# Patient Record
Sex: FEMALE | Race: BLACK OR AFRICAN AMERICAN | NOT HISPANIC OR LATINO | URBAN - METROPOLITAN AREA
[De-identification: names, ages, dates, MRNs, and addresses within clinical notes are randomized per-mention and may not be internally consistent; named-entity substitution may affect disease eponyms.]

---

## 2024-09-27 ENCOUNTER — EMERGENCY (EMERGENCY)
Facility: HOSPITAL | Age: 24
LOS: 1 days | Discharge: HOME | End: 2024-09-27
Attending: STUDENT IN AN ORGANIZED HEALTH CARE EDUCATION/TRAINING PROGRAM
Payer: MEDICAID

## 2024-09-27 VITALS
SYSTOLIC BLOOD PRESSURE: 148 MMHG | RESPIRATION RATE: 19 BRPM | TEMPERATURE: 98 F | OXYGEN SATURATION: 99 % | WEIGHT: 125 LBS | HEART RATE: 114 BPM | DIASTOLIC BLOOD PRESSURE: 72 MMHG | HEIGHT: 65 IN

## 2024-09-27 LAB
ALBUMIN SERPL ELPH-MCNC: 4.3 G/DL — SIGNIFICANT CHANGE UP (ref 3.5–5)
ANION GAP SERPL CALC-SCNC: 10 MMOL/L — SIGNIFICANT CHANGE UP (ref 5–17)
BASOPHILS # BLD AUTO: 0.02 K/UL — SIGNIFICANT CHANGE UP (ref 0–0.2)
BASOPHILS NFR BLD AUTO: 0.1 % — SIGNIFICANT CHANGE UP (ref 0–2)
BUN SERPL-MCNC: 10 MG/DL — SIGNIFICANT CHANGE UP (ref 7–18)
CALCIUM SERPL-MCNC: 9.5 MG/DL — SIGNIFICANT CHANGE UP (ref 8.4–10.5)
CHLORIDE SERPL-SCNC: 107 MMOL/L — SIGNIFICANT CHANGE UP (ref 96–108)
CO2 SERPL-SCNC: 20 MMOL/L — LOW (ref 22–31)
EOSINOPHIL # BLD AUTO: 0.01 K/UL — SIGNIFICANT CHANGE UP (ref 0–0.5)
EOSINOPHIL NFR BLD AUTO: 0.1 % — SIGNIFICANT CHANGE UP (ref 0–6)
FLUAV AG NPH QL: SIGNIFICANT CHANGE UP
FLUBV AG NPH QL: SIGNIFICANT CHANGE UP
GLUCOSE SERPL-MCNC: 111 MG/DL — HIGH (ref 70–99)
HCT VFR BLD CALC: 38.2 % — SIGNIFICANT CHANGE UP (ref 34.5–45)
HGB BLD-MCNC: 13.2 G/DL — SIGNIFICANT CHANGE UP (ref 11.5–15.5)
IMM GRANULOCYTES NFR BLD AUTO: 0.5 % — SIGNIFICANT CHANGE UP (ref 0–0.9)
LACTATE SERPL-SCNC: 1.5 MMOL/L — SIGNIFICANT CHANGE UP (ref 0.7–2)
LIDOCAIN IGE QN: 18 U/L — SIGNIFICANT CHANGE UP (ref 13–75)
LYMPHOCYTES # BLD AUTO: 0.85 K/UL — LOW (ref 1–3.3)
LYMPHOCYTES # BLD AUTO: 6 % — LOW (ref 13–44)
MCHC RBC-ENTMCNC: 30.6 PG — SIGNIFICANT CHANGE UP (ref 27–34)
MCHC RBC-ENTMCNC: 34.6 GM/DL — SIGNIFICANT CHANGE UP (ref 32–36)
MCV RBC AUTO: 88.6 FL — SIGNIFICANT CHANGE UP (ref 80–100)
MONOCYTES # BLD AUTO: 0.7 K/UL — SIGNIFICANT CHANGE UP (ref 0–0.9)
MONOCYTES NFR BLD AUTO: 4.9 % — SIGNIFICANT CHANGE UP (ref 2–14)
NEUTROPHILS # BLD AUTO: 12.51 K/UL — HIGH (ref 1.8–7.4)
NEUTROPHILS NFR BLD AUTO: 88.4 % — HIGH (ref 43–77)
NRBC # BLD: 0 /100 WBCS — SIGNIFICANT CHANGE UP (ref 0–0)
PLATELET # BLD AUTO: 266 K/UL — SIGNIFICANT CHANGE UP (ref 150–400)
POTASSIUM SERPL-MCNC: 3.7 MMOL/L — SIGNIFICANT CHANGE UP (ref 3.5–5.3)
POTASSIUM SERPL-SCNC: 3.7 MMOL/L — SIGNIFICANT CHANGE UP (ref 3.5–5.3)
RBC # BLD: 4.31 M/UL — SIGNIFICANT CHANGE UP (ref 3.8–5.2)
RBC # FLD: 12 % — SIGNIFICANT CHANGE UP (ref 10.3–14.5)
SARS-COV-2 RNA SPEC QL NAA+PROBE: SIGNIFICANT CHANGE UP
SODIUM SERPL-SCNC: 137 MMOL/L — SIGNIFICANT CHANGE UP (ref 135–145)
WBC # BLD: 14.16 K/UL — HIGH (ref 3.8–10.5)
WBC # FLD AUTO: 14.16 K/UL — HIGH (ref 3.8–10.5)

## 2024-09-27 PROCEDURE — 99285 EMERGENCY DEPT VISIT HI MDM: CPT

## 2024-09-27 RX ORDER — FAMOTIDINE 10 MG/ML
20 INJECTION INTRAVENOUS ONCE
Refills: 0 | Status: COMPLETED | OUTPATIENT
Start: 2024-09-27 | End: 2024-09-27

## 2024-09-27 RX ORDER — MAGNESIUM, ALUMINUM HYDROXIDE 200-225/5
30 SUSPENSION, ORAL (FINAL DOSE FORM) ORAL ONCE
Refills: 0 | Status: COMPLETED | OUTPATIENT
Start: 2024-09-27 | End: 2024-09-27

## 2024-09-27 RX ORDER — ACETAMINOPHEN 325 MG/1
650 TABLET ORAL ONCE
Refills: 0 | Status: COMPLETED | OUTPATIENT
Start: 2024-09-27 | End: 2024-09-27

## 2024-09-27 RX ORDER — ONDANSETRON 2 MG/ML
4 INJECTION, SOLUTION INTRAMUSCULAR; INTRAVENOUS ONCE
Refills: 0 | Status: COMPLETED | OUTPATIENT
Start: 2024-09-27 | End: 2024-09-28

## 2024-09-27 RX ORDER — SODIUM CHLORIDE 9 MG/ML
1000 INJECTION INTRAMUSCULAR; INTRAVENOUS; SUBCUTANEOUS ONCE
Refills: 0 | Status: COMPLETED | OUTPATIENT
Start: 2024-09-27 | End: 2024-09-27

## 2024-09-27 RX ORDER — METHYLPREDNISOLONE 4 MG
125 TABLET ORAL ONCE
Refills: 0 | Status: COMPLETED | OUTPATIENT
Start: 2024-09-27 | End: 2024-09-27

## 2024-09-28 VITALS
RESPIRATION RATE: 18 BRPM | SYSTOLIC BLOOD PRESSURE: 138 MMHG | DIASTOLIC BLOOD PRESSURE: 80 MMHG | HEART RATE: 87 BPM | OXYGEN SATURATION: 94 % | TEMPERATURE: 99 F

## 2024-09-28 LAB
ALP SERPL-CCNC: 80 U/L — SIGNIFICANT CHANGE UP (ref 40–120)
ALT FLD-CCNC: 30 U/L DA — SIGNIFICANT CHANGE UP (ref 10–60)
AST SERPL-CCNC: 35 U/L — SIGNIFICANT CHANGE UP (ref 10–40)
BILIRUB SERPL-MCNC: 0.6 MG/DL — SIGNIFICANT CHANGE UP (ref 0.2–1.2)
CREAT SERPL-MCNC: 0.83 MG/DL — SIGNIFICANT CHANGE UP (ref 0.5–1.3)
EGFR: 100 ML/MIN/1.73M2 — SIGNIFICANT CHANGE UP
HCG SERPL-ACNC: <1 MIU/ML — SIGNIFICANT CHANGE UP
PROT SERPL-MCNC: 8.6 G/DL — HIGH (ref 6–8.3)

## 2024-09-28 PROCEDURE — 84702 CHORIONIC GONADOTROPIN TEST: CPT

## 2024-09-28 PROCEDURE — 36415 COLL VENOUS BLD VENIPUNCTURE: CPT

## 2024-09-28 PROCEDURE — 94640 AIRWAY INHALATION TREATMENT: CPT

## 2024-09-28 PROCEDURE — 71045 X-RAY EXAM CHEST 1 VIEW: CPT | Mod: 26

## 2024-09-28 PROCEDURE — 96375 TX/PRO/DX INJ NEW DRUG ADDON: CPT

## 2024-09-28 PROCEDURE — 99284 EMERGENCY DEPT VISIT MOD MDM: CPT | Mod: 25

## 2024-09-28 PROCEDURE — 83605 ASSAY OF LACTIC ACID: CPT

## 2024-09-28 PROCEDURE — 96374 THER/PROPH/DIAG INJ IV PUSH: CPT

## 2024-09-28 PROCEDURE — 80053 COMPREHEN METABOLIC PANEL: CPT

## 2024-09-28 PROCEDURE — 71045 X-RAY EXAM CHEST 1 VIEW: CPT

## 2024-09-28 PROCEDURE — 83690 ASSAY OF LIPASE: CPT

## 2024-09-28 PROCEDURE — 87636 SARSCOV2 & INF A&B AMP PRB: CPT

## 2024-09-28 PROCEDURE — 85025 COMPLETE CBC W/AUTO DIFF WBC: CPT

## 2024-09-28 RX ORDER — PREDNISONE 10 MG
1 TABLET, DOSE PACK ORAL
Qty: 4 | Refills: 0
Start: 2024-09-28 | End: 2024-10-01

## 2024-09-28 RX ORDER — ONDANSETRON 2 MG/ML
1 INJECTION, SOLUTION INTRAMUSCULAR; INTRAVENOUS
Qty: 10 | Refills: 0
Start: 2024-09-28 | End: 2024-10-02

## 2024-09-28 RX ORDER — ONDANSETRON 2 MG/ML
1 INJECTION, SOLUTION INTRAMUSCULAR; INTRAVENOUS
Qty: 1 | Refills: 0
Start: 2024-09-28 | End: 2024-09-30

## 2024-09-28 RX ORDER — ONDANSETRON 2 MG/ML
8 INJECTION, SOLUTION INTRAMUSCULAR; INTRAVENOUS ONCE
Refills: 0 | Status: COMPLETED | OUTPATIENT
Start: 2024-09-28 | End: 2024-09-28

## 2024-09-28 RX ADMIN — Medication 30 MILLILITER(S): at 01:08

## 2024-09-28 RX ADMIN — ONDANSETRON 4 MILLIGRAM(S): 2 INJECTION, SOLUTION INTRAMUSCULAR; INTRAVENOUS at 01:05

## 2024-09-28 RX ADMIN — ACETAMINOPHEN 650 MILLIGRAM(S): 325 TABLET ORAL at 01:08

## 2024-09-28 RX ADMIN — SODIUM CHLORIDE 1000 MILLILITER(S): 9 INJECTION INTRAMUSCULAR; INTRAVENOUS; SUBCUTANEOUS at 01:06

## 2024-09-28 RX ADMIN — FAMOTIDINE 20 MILLIGRAM(S): 10 INJECTION INTRAVENOUS at 01:05

## 2024-09-28 RX ADMIN — Medication 1 PUFF(S): at 01:09

## 2024-09-28 RX ADMIN — ONDANSETRON 8 MILLIGRAM(S): 2 INJECTION, SOLUTION INTRAMUSCULAR; INTRAVENOUS at 01:39

## 2024-09-28 RX ADMIN — Medication 125 MILLIGRAM(S): at 01:08

## 2024-09-28 NOTE — ED PROVIDER NOTE - PHYSICAL EXAMINATION
Patient Education - Anticoagulation Gen: no acute distress  Head: normocephalic, atraumatic  Lung: Bilateral wheezes, no rhonchi, not tachypneic, satting well on RA in no distress   CV: normal s1/s2, rrr,   Abd: soft, non-tender, non-distended  MSK: No edema, no visible deformities, full range of motion in all 4 extremities  Neuro: No focal neurologic deficits

## 2024-09-28 NOTE — ED PROVIDER NOTE - OBJECTIVE STATEMENT
Patient is a 24-year-old female chronic smoker presenting with cough, congestion, shortness of breath and nausea.  Patient states she has a several days of dry cough, congestion and intermittent shortness of breath.  Patient states she has been wheezing.  Patient states she smokes marijuana daily as well as some cigarettes.  Patient drinks alcohol intermittently.  Patient smoked today and drank some alcohol after which she started coughing and developed nausea and posttussive abdominal pain.  Denies any vomiting.  Denies any fevers.  Denies any other past medical history, allergies, surgeries.  Denies daily alcohol use.

## 2024-09-28 NOTE — ED PROVIDER NOTE - PATIENT PORTAL LINK FT
You can access the FollowMyHealth Patient Portal offered by [facility] by registering at the following website: [email or web address]. By joining [employer]’s FollowMyHealth portal, you will also be able to view your health information using other applications (apps) compatible with our system.

## 2024-09-28 NOTE — ED PROVIDER NOTE - CLINICAL SUMMARY MEDICAL DECISION MAKING FREE TEXT BOX
Patient is a 24-year-old female chronic smoker presenting with cough, congestion, shortness of breath and nausea.  VSS, bilateral wheezes, satting well on RA, belly soft non tender no peritoneal signs.  -   Will treat reactive airway disease likely secondary to upper respiratory infection with nebs and steroids, x-ray viral swab to rule out  pneumonia versus URI, check labs, rule out electrolyte abnormalities and pancreatitis, treat likely gastritis,  reassess.

## 2024-09-28 NOTE — ED PROVIDER NOTE - NSFOLLOWUPINSTRUCTIONS_ED_ALL_ED_FT
Follow up with your Primary Care Doctor within 7 days. If you do not have one, you can call the Internal Medicine office number below and make an appointment.    [facility]  Internal Medicine  [address] [place] [address]  Phone: [phone]  Fax: [phone]      Bronchospasm, Adult  Outline of a person's upper body showing the lungs, with close-ups of two airways, one normal and one tightened.  Bronchospasm is a tightening of the smooth muscle that wraps around the small airways in the lungs. When the muscle tightens, the small airways narrow. Narrowed airways limit the air you breathe in or out of your lungs. Inflammation (swelling) and more mucus (sputum) than usual can further irritate the airways. This can make it very hard to breathe. Bronchospasm can happen suddenly or over a period of time.    What are the causes?  Common causes of this condition include:  An infection, such as a cold or sinus drainage.  Exercise.  Strong odors from aerosol sprays, and fumes from perfume, candles, and household .  Cold air.  Stress or strong emotions such as crying or laughing.  What increases the risk?  The following factors may make you more likely to develop this condition:  Having asthma.  Smoking or being around someone who smokes (secondhand smoke).  Seasonal allergies, such as pollen or mold.  Allergic reaction (anaphylaxis) to food, medicine, or insect bites or stings.  What are the signs or symptoms?  Symptoms of this condition include:  Making a high-pitched whistling sound when you breathe, most often when you breathe out (wheezing).  Coughing.  Chest tightness.  Shortness of breath.  Decreased ability to exercise.  Noisy breathing or a high-pitched cough.  How is this diagnosed?  This condition may be diagnosed based on your medical history and a physical exam. Your health care provider may also perform tests, including:  A chest X-ray.  Lung function tests.  How is this treated?  Two respiratory inhalers.  This condition may be treated by:  Using inhaled medicines. These open up (relax) the airways and help you breathe. They can be taken with a metered dose inhaler or a nebulizer device.  Taking corticosteroid medicines. These may be given to reduce inflammation and swelling.  Removing the irritant or trigger that started the bronchospasm.  Follow these instructions at home:  Medicines    Take over-the-counter and prescription medicines only as told by your health care provider.  If you need to use an inhaler or nebulizer to take your medicine, ask your health care provider how to use it correctly.  You may be given a spacer to use with your inhaler. This makes it easier to get the medicine from the inhaler into your lungs.  Lifestyle    Do not use any products that contain nicotine or tobacco. These products include cigarettes, chewing tobacco, and vaping devices, such as e-cigarettes. If you need help quitting, ask your health care provider.  Keep track of things that trigger your bronchospasm. Avoid these if possible.  When pollen, air pollution, or humidity levels are bad, keep windows closed and use an air conditioner or go to places that have air conditioning.  Find ways to manage stress and your emotions, such as mindfulness, relaxation, or breathing exercises.  Activity    Some people have bronchospasm when they exercise. This is called exercise-induced bronchoconstriction (EIB). If you have this problem, talk with your health care provider about how to manage EIB. Some tips include:  Using your fast-acting inhaler before exercise.  Exercising indoors if it is very cold or humid, or if the pollen and mold counts are high.  Warming up and cool down before and after exercise.  Stopping exercising right away if your symptoms start or get worse.  General instructions    If you have asthma, make sure you have an asthma action plan.  Stay up to date on your immunizations.  Keep all follow-up visits. This is important.  Get help right away if:  You have trouble breathing.  Your wheezing and coughing do not get better after taking your medicine.  You have chest pain.  You have trouble speaking more than one-word sentences.  These symptoms may be an emergency. Get help right away. Call 911.  Do not wait to see if the symptoms will go away.  Do not drive yourself to the hospital.  Summary  Bronchospasm is a tightening of the smooth muscle that wraps around the small airways in the lungs.  Some people have bronchospasm when they exercise. This is called exercise-induced bronchoconstriction (EIB). If you have this problem, talk with your health care provider about how to manage EIB.  Do not use any products that contain nicotine or tobacco. These products include cigarettes, chewing tobacco, and vaping devices, such as e-cigarettes. If you need help quitting, ask your health care provider.  Get help right away if your wheezing and coughing do not get better after taking your medicine.  This information is not intended to replace advice given to you by your health care provider. Make sure you discuss any questions you have with your health care provider.    Document Revised: 07/11/2022 Document Reviewed: 07/11/2022

## 2024-09-28 NOTE — ED ADULT NURSE NOTE - NSFALLUNIVINTERV_ED_ALL_ED
Bed/Stretcher in lowest position, wheels locked, appropriate side rails in place/Call bell, personal items and telephone in reach/Instruct patient to call for assistance before getting out of bed/chair/stretcher/Non-slip footwear applied when patient is off stretcher/[place] to call system/Physically safe environment - no spills, clutter or unnecessary equipment/Purposeful proactive rounding/Room/bathroom lighting operational, light cord in reach

## 2024-09-28 NOTE — ED PROVIDER NOTE - NSTIMEPROVIDERCAREINITIATE_GEN_ER
-CT scan showing appendiceal findings, likely  ?Early appendicitis  -N.p.o. for now  -Pain and nausea control PRN  -Appreciate general surgery consult and recommendations: Dr. Westbrook  -She received 1 dose of metronidazole in the emergency department.  -Add Rocephin since she also has a UTI.  -She is tachycardic however no other source criteria.  Lactic was 1.3.     27-Sep-2024 22:41